# Patient Record
Sex: FEMALE | Race: WHITE
[De-identification: names, ages, dates, MRNs, and addresses within clinical notes are randomized per-mention and may not be internally consistent; named-entity substitution may affect disease eponyms.]

---

## 2022-02-22 ENCOUNTER — HOSPITAL ENCOUNTER (OUTPATIENT)
Dept: HOSPITAL 102 - ANHED | Age: 72
Setting detail: OBSERVATION
LOS: 1 days | Discharge: HOME | End: 2022-02-23
Payer: MEDICARE

## 2022-02-22 VITALS
RESPIRATION RATE: 23 BRPM | OXYGEN SATURATION: 100 % | DIASTOLIC BLOOD PRESSURE: 82 MMHG | SYSTOLIC BLOOD PRESSURE: 140 MMHG | HEART RATE: 71 BPM

## 2022-02-22 VITALS
SYSTOLIC BLOOD PRESSURE: 98 MMHG | OXYGEN SATURATION: 100 % | DIASTOLIC BLOOD PRESSURE: 74 MMHG | RESPIRATION RATE: 23 BRPM | HEART RATE: 75 BPM

## 2022-02-22 VITALS — DIASTOLIC BLOOD PRESSURE: 74 MMHG | HEART RATE: 75 BPM | SYSTOLIC BLOOD PRESSURE: 98 MMHG | RESPIRATION RATE: 23 BRPM

## 2022-02-22 VITALS
OXYGEN SATURATION: 100 % | DIASTOLIC BLOOD PRESSURE: 92 MMHG | RESPIRATION RATE: 22 BRPM | HEART RATE: 86 BPM | SYSTOLIC BLOOD PRESSURE: 176 MMHG

## 2022-02-22 VITALS
OXYGEN SATURATION: 98 % | RESPIRATION RATE: 18 BRPM | HEART RATE: 100 BPM | TEMPERATURE: 98.06 F | DIASTOLIC BLOOD PRESSURE: 82 MMHG | SYSTOLIC BLOOD PRESSURE: 125 MMHG

## 2022-02-22 VITALS
OXYGEN SATURATION: 100 % | SYSTOLIC BLOOD PRESSURE: 161 MMHG | DIASTOLIC BLOOD PRESSURE: 88 MMHG | RESPIRATION RATE: 17 BRPM | HEART RATE: 79 BPM

## 2022-02-22 VITALS
RESPIRATION RATE: 12 BRPM | DIASTOLIC BLOOD PRESSURE: 89 MMHG | SYSTOLIC BLOOD PRESSURE: 193 MMHG | HEART RATE: 76 BPM | OXYGEN SATURATION: 99 %

## 2022-02-22 VITALS
HEART RATE: 75 BPM | SYSTOLIC BLOOD PRESSURE: 148 MMHG | DIASTOLIC BLOOD PRESSURE: 86 MMHG | RESPIRATION RATE: 21 BRPM | OXYGEN SATURATION: 100 %

## 2022-02-22 VITALS
DIASTOLIC BLOOD PRESSURE: 80 MMHG | RESPIRATION RATE: 18 BRPM | SYSTOLIC BLOOD PRESSURE: 160 MMHG | OXYGEN SATURATION: 100 % | HEART RATE: 79 BPM

## 2022-02-22 VITALS
RESPIRATION RATE: 25 BRPM | SYSTOLIC BLOOD PRESSURE: 188 MMHG | OXYGEN SATURATION: 100 % | DIASTOLIC BLOOD PRESSURE: 74 MMHG | HEART RATE: 75 BPM

## 2022-02-22 VITALS — HEART RATE: 78 BPM

## 2022-02-22 VITALS
SYSTOLIC BLOOD PRESSURE: 115 MMHG | OXYGEN SATURATION: 96 % | DIASTOLIC BLOOD PRESSURE: 67 MMHG | TEMPERATURE: 97.52 F | HEART RATE: 81 BPM | RESPIRATION RATE: 20 BRPM

## 2022-02-22 VITALS
RESPIRATION RATE: 18 BRPM | SYSTOLIC BLOOD PRESSURE: 152 MMHG | OXYGEN SATURATION: 100 % | DIASTOLIC BLOOD PRESSURE: 102 MMHG | TEMPERATURE: 96.44 F | HEART RATE: 83 BPM

## 2022-02-22 VITALS
HEART RATE: 70 BPM | RESPIRATION RATE: 18 BRPM | OXYGEN SATURATION: 100 % | DIASTOLIC BLOOD PRESSURE: 87 MMHG | SYSTOLIC BLOOD PRESSURE: 145 MMHG

## 2022-02-22 VITALS — BODY MASS INDEX: 42.1 KG/M2 | BODY MASS INDEX: 1 KG/M2

## 2022-02-22 VITALS
HEART RATE: 83 BPM | RESPIRATION RATE: 13 BRPM | OXYGEN SATURATION: 99 % | SYSTOLIC BLOOD PRESSURE: 140 MMHG | DIASTOLIC BLOOD PRESSURE: 87 MMHG

## 2022-02-22 VITALS
DIASTOLIC BLOOD PRESSURE: 79 MMHG | HEART RATE: 80 BPM | RESPIRATION RATE: 21 BRPM | SYSTOLIC BLOOD PRESSURE: 163 MMHG | OXYGEN SATURATION: 100 %

## 2022-02-22 VITALS — HEART RATE: 75 BPM

## 2022-02-22 VITALS — HEART RATE: 87 BPM

## 2022-02-22 DIAGNOSIS — Z98.84: ICD-10-CM

## 2022-02-22 DIAGNOSIS — G47.33: ICD-10-CM

## 2022-02-22 DIAGNOSIS — I34.0: ICD-10-CM

## 2022-02-22 DIAGNOSIS — F41.9: ICD-10-CM

## 2022-02-22 DIAGNOSIS — I48.91: ICD-10-CM

## 2022-02-22 DIAGNOSIS — Z90.49: ICD-10-CM

## 2022-02-22 DIAGNOSIS — I48.20: ICD-10-CM

## 2022-02-22 DIAGNOSIS — E03.9: ICD-10-CM

## 2022-02-22 DIAGNOSIS — K21.9: ICD-10-CM

## 2022-02-22 DIAGNOSIS — I10: ICD-10-CM

## 2022-02-22 DIAGNOSIS — Z79.01: ICD-10-CM

## 2022-02-22 DIAGNOSIS — R07.9: Primary | ICD-10-CM

## 2022-02-22 LAB
ADD MANUAL DIFF: NO
ALANINE AMINOTRANSFERASE: 14 U/L (ref 4–35)
ALBUMIN LEVEL: 4.2 G/DL (ref 3.5–5.1)
ALBUMIN SPEC-MCNC: 4.2 G/DL (ref 3.5–5.1)
ALKALINE PHOSPHATASE: 81 U/L (ref 38–126)
ALP SERPL-CCNC: 81 U/L (ref 38–126)
ALT SERPL-CCNC: 14 U/L (ref 4–35)
ANION GAP: 6 MMOL/L (ref 8–16)
ASPARTATE AMINO TRANSFERASE: 26 U/L (ref 14–36)
AST SERPL-CCNC: 26 U/L (ref 14–36)
BASIC METABOLIC AND HEMATOCRIT PNL BLD: 39.3 % (ref 37–47)
BILIRUBIN,TOTAL: 0.4 MG/DL (ref 0.2–1.3)
BLOOD UREA NITROGEN: 18 MG/DL (ref 7–17)
BUN SERPL-MCNC: 18 MG/DL (ref 7–17)
CALCIUM: 9.5 MG/DL (ref 8.4–10.2)
CARBON DIOXIDE: 26 MMOL/L (ref 22–30)
CHLORIDE SPEC-MCNC: 107 MMOL/L (ref 98–107)
CHLORIDE: 107 MMOL/L (ref 98–107)
CREAT CL PREDICTED SERPL C-G-VRATE: 57 ML/MIN
DELTA APTT PPP: 28.5 SECONDS (ref 22.3–36.8)
ESTIMATED CRCL CALCULATION: 57 ML/MIN
ESTIMATED GLOMERULAR FILT RATE: > 60
GFRSERPLBLD MDRD-ARVRAT: > 60 ML/MIN/{1.73_M2}
GLUCOSE SERPL-MCNC: 91 MG/DL (ref 65–110)
GLUCOSE: 91 MG/DL (ref 65–110)
HEMATOCRIT: 39.3 % (ref 37–47)
HEMOGLOBIN: 12.5 G/DL (ref 12–15)
HGB+HCT PNL BLD: 39.3 % (ref 37–47)
INR: 1.1
IPF CHARGE: (no result)
LIPASE SERPL-CCNC: 32 U/L (ref 23–300)
LIPASE: 32 U/L (ref 23–300)
MCV RBC: 90.1 FL (ref 80–100)
MEAN CORPUSCULAR HEMOGLOBIN: 28.7 PG (ref 26–34)
MEAN CORPUSCULAR HGB CONC: 31.8 G/DL (ref 32–36)
PARTIAL THROMBOPLASTIN TIME: 28.5 SECONDS (ref 22.3–36.8)
PLATELET COUNT RESULT: 182 K/MM3 (ref 150–375)
POTASSIUM: 4.4 MMOL/L (ref 3.4–5)
PROT SERPL-MCNC: 7 G/DL (ref 6.3–8.2)
PROTHROMBIN TIME: 13.8 SECONDS (ref 11.1–14.7)
RED BLOOD COUNT: 4.36 M/MM3 (ref 4.2–5.4)
SODIUM: 139 MMOL/L (ref 137–145)
TOTAL PROTEIN: 7 G/DL (ref 6.3–8.2)
TROPONIN I: < 0.012 NG/ML (ref 0–0.03)
WHITE BLOOD COUNT: 3.7 K/MM3 (ref 4.5–10)

## 2022-02-22 PROCEDURE — 71046 X-RAY EXAM CHEST 2 VIEWS: CPT

## 2022-02-22 PROCEDURE — 99285 EMERGENCY DEPT VISIT HI MDM: CPT

## 2022-02-22 PROCEDURE — 84484 ASSAY OF TROPONIN QUANT: CPT

## 2022-02-22 PROCEDURE — 85025 COMPLETE CBC W/AUTO DIFF WBC: CPT

## 2022-02-22 PROCEDURE — A9270 NON-COVERED ITEM OR SERVICE: HCPCS

## 2022-02-22 PROCEDURE — 85610 PROTHROMBIN TIME: CPT

## 2022-02-22 PROCEDURE — 83690 ASSAY OF LIPASE: CPT

## 2022-02-22 PROCEDURE — 93017 CV STRESS TEST TRACING ONLY: CPT

## 2022-02-22 PROCEDURE — A9502 TC99M TETROFOSMIN: HCPCS

## 2022-02-22 PROCEDURE — 36415 COLL VENOUS BLD VENIPUNCTURE: CPT

## 2022-02-22 PROCEDURE — 80053 COMPREHEN METABOLIC PANEL: CPT

## 2022-02-22 PROCEDURE — 93005 ELECTROCARDIOGRAM TRACING: CPT

## 2022-02-22 PROCEDURE — 78452 HT MUSCLE IMAGE SPECT MULT: CPT

## 2022-02-22 PROCEDURE — G0378 HOSPITAL OBSERVATION PER HR: HCPCS

## 2022-02-22 PROCEDURE — 85730 THROMBOPLASTIN TIME PARTIAL: CPT

## 2022-02-22 RX ADMIN — ALPRAZOLAM 0.5 MG: 0.5 TABLET ORAL at 23:27

## 2022-02-22 RX ADMIN — METOPROLOL TARTRATE 25 MG: 25 TABLET, FILM COATED ORAL at 23:27

## 2022-02-22 RX ADMIN — ASPIRIN 81 MG CHEWABLE TABLET 324 MG: 81 TABLET CHEWABLE at 14:36

## 2022-02-22 RX ADMIN — APIXABAN 5 MG: 5 TABLET, FILM COATED ORAL at 23:27

## 2022-02-23 VITALS
OXYGEN SATURATION: 100 % | HEART RATE: 80 BPM | DIASTOLIC BLOOD PRESSURE: 50 MMHG | SYSTOLIC BLOOD PRESSURE: 106 MMHG | RESPIRATION RATE: 20 BRPM | TEMPERATURE: 97.16 F

## 2022-02-23 VITALS
OXYGEN SATURATION: 99 % | HEART RATE: 77 BPM | DIASTOLIC BLOOD PRESSURE: 96 MMHG | SYSTOLIC BLOOD PRESSURE: 146 MMHG | RESPIRATION RATE: 16 BRPM | TEMPERATURE: 98 F

## 2022-02-23 VITALS
RESPIRATION RATE: 18 BRPM | OXYGEN SATURATION: 100 % | HEART RATE: 67 BPM | TEMPERATURE: 97 F | SYSTOLIC BLOOD PRESSURE: 119 MMHG | DIASTOLIC BLOOD PRESSURE: 101 MMHG

## 2022-02-23 VITALS
RESPIRATION RATE: 16 BRPM | SYSTOLIC BLOOD PRESSURE: 101 MMHG | OXYGEN SATURATION: 97 % | TEMPERATURE: 98.24 F | HEART RATE: 94 BPM | DIASTOLIC BLOOD PRESSURE: 55 MMHG

## 2022-02-23 VITALS — HEART RATE: 70 BPM

## 2022-02-23 VITALS — HEART RATE: 106 BPM

## 2022-02-23 VITALS — HEART RATE: 76 BPM

## 2022-02-23 VITALS — HEART RATE: 80 BPM

## 2022-02-23 RX ADMIN — Medication 10 MG: at 15:32

## 2022-02-23 RX ADMIN — SERTRALINE HYDROCHLORIDE 200 MG: 50 TABLET, FILM COATED ORAL at 15:33

## 2022-02-23 RX ADMIN — MONTELUKAST SODIUM 10 MG: 10 TABLET, FILM COATED ORAL at 15:32

## 2022-02-23 RX ADMIN — APIXABAN 5 MG: 5 TABLET, FILM COATED ORAL at 15:33

## 2022-02-23 RX ADMIN — LEVOTHYROXINE SODIUM 88 MCG: 88 TABLET ORAL at 05:26

## 2022-02-23 RX ADMIN — METOPROLOL TARTRATE 25 MG: 25 TABLET, FILM COATED ORAL at 15:32

## 2022-02-23 RX ADMIN — Medication 1 CAP: at 15:33

## 2022-02-23 RX ADMIN — PANTOPRAZOLE SODIUM 40 MG: 40 TABLET, DELAYED RELEASE ORAL at 15:33

## 2024-01-05 ENCOUNTER — HOSPITAL ENCOUNTER (OUTPATIENT)
Dept: HOSPITAL 102 - ANHIMG | Age: 74
Discharge: HOME | End: 2024-01-05
Payer: MEDICARE

## 2024-01-05 DIAGNOSIS — M85.852: ICD-10-CM

## 2024-01-05 DIAGNOSIS — M81.0: ICD-10-CM

## 2024-01-05 DIAGNOSIS — M85.851: ICD-10-CM

## 2024-01-05 DIAGNOSIS — Z78.0: ICD-10-CM

## 2024-01-05 DIAGNOSIS — Z12.31: Primary | ICD-10-CM

## 2024-01-05 PROCEDURE — 77080 DXA BONE DENSITY AXIAL: CPT

## 2024-01-05 PROCEDURE — 77063 BREAST TOMOSYNTHESIS BI: CPT

## 2024-01-05 PROCEDURE — 77067 SCR MAMMO BI INCL CAD: CPT

## 2024-08-31 ENCOUNTER — HOSPITAL ENCOUNTER (EMERGENCY)
Age: 74
Discharge: HOME | End: 2024-08-31
Attending: STUDENT IN AN ORGANIZED HEALTH CARE EDUCATION/TRAINING PROGRAM
Payer: MEDICARE

## 2024-08-31 ENCOUNTER — APPOINTMENT (OUTPATIENT)
Dept: GENERAL RADIOLOGY | Age: 74
End: 2024-08-31
Payer: MEDICARE

## 2024-08-31 VITALS
BODY MASS INDEX: 39.27 KG/M2 | SYSTOLIC BLOOD PRESSURE: 134 MMHG | WEIGHT: 200 LBS | TEMPERATURE: 98.2 F | OXYGEN SATURATION: 97 % | RESPIRATION RATE: 17 BRPM | DIASTOLIC BLOOD PRESSURE: 73 MMHG | HEART RATE: 85 BPM | HEIGHT: 60 IN

## 2024-08-31 DIAGNOSIS — R11.0 NAUSEA: ICD-10-CM

## 2024-08-31 DIAGNOSIS — M54.6 ACUTE LEFT-SIDED THORACIC BACK PAIN: Primary | ICD-10-CM

## 2024-08-31 LAB
ALBUMIN SERPL-MCNC: 3.7 G/DL (ref 3.5–5.2)
ALP SERPL-CCNC: 81 U/L (ref 35–104)
ALT SERPL-CCNC: 9 U/L (ref 5–33)
ANION GAP SERPL CALCULATED.3IONS-SCNC: 10 MMOL/L (ref 7–19)
AST SERPL-CCNC: 16 U/L (ref 5–32)
BASOPHILS # BLD: 0 K/UL (ref 0–0.2)
BASOPHILS NFR BLD: 0.8 % (ref 0–1)
BILIRUB SERPL-MCNC: 0.5 MG/DL (ref 0.2–1.2)
BUN SERPL-MCNC: 13 MG/DL (ref 8–23)
CALCIUM SERPL-MCNC: 8.9 MG/DL (ref 8.8–10.2)
CHLORIDE SERPL-SCNC: 107 MMOL/L (ref 98–111)
CO2 SERPL-SCNC: 25 MMOL/L (ref 22–29)
CREAT SERPL-MCNC: 0.6 MG/DL (ref 0.5–0.9)
EOSINOPHIL # BLD: 0 K/UL (ref 0–0.6)
EOSINOPHIL NFR BLD: 1 % (ref 0–5)
ERYTHROCYTE [DISTWIDTH] IN BLOOD BY AUTOMATED COUNT: 13.1 % (ref 11.5–14.5)
GLUCOSE SERPL-MCNC: 96 MG/DL (ref 70–99)
HCT VFR BLD AUTO: 36.4 % (ref 37–47)
HGB BLD-MCNC: 11.2 G/DL (ref 12–16)
IMM GRANULOCYTES # BLD: 0 K/UL
LIPASE SERPL-CCNC: 13 U/L (ref 13–60)
LYMPHOCYTES # BLD: 0.7 K/UL (ref 1.1–4.5)
LYMPHOCYTES NFR BLD: 18.4 % (ref 20–40)
MCH RBC QN AUTO: 28.6 PG (ref 27–31)
MCHC RBC AUTO-ENTMCNC: 30.8 G/DL (ref 33–37)
MCV RBC AUTO: 93.1 FL (ref 81–99)
MONOCYTES # BLD: 0.3 K/UL (ref 0–0.9)
MONOCYTES NFR BLD: 7.4 % (ref 0–10)
NEUTROPHILS # BLD: 2.8 K/UL (ref 1.5–7.5)
NEUTS SEG NFR BLD: 72.1 % (ref 50–65)
PLATELET # BLD AUTO: 176 K/UL (ref 130–400)
PMV BLD AUTO: 10 FL (ref 9.4–12.3)
POTASSIUM SERPL-SCNC: 4 MMOL/L (ref 3.5–5)
PROT SERPL-MCNC: 5.9 G/DL (ref 6.4–8.3)
RBC # BLD AUTO: 3.91 M/UL (ref 4.2–5.4)
SODIUM SERPL-SCNC: 142 MMOL/L (ref 136–145)
TROPONIN, HIGH SENSITIVITY: 22 NG/L (ref 0–14)
TROPONIN, HIGH SENSITIVITY: 22 NG/L (ref 0–14)
WBC # BLD AUTO: 3.9 K/UL (ref 4.8–10.8)

## 2024-08-31 PROCEDURE — 6360000002 HC RX W HCPCS: Performed by: STUDENT IN AN ORGANIZED HEALTH CARE EDUCATION/TRAINING PROGRAM

## 2024-08-31 PROCEDURE — 71045 X-RAY EXAM CHEST 1 VIEW: CPT

## 2024-08-31 PROCEDURE — 96374 THER/PROPH/DIAG INJ IV PUSH: CPT

## 2024-08-31 PROCEDURE — 36415 COLL VENOUS BLD VENIPUNCTURE: CPT

## 2024-08-31 PROCEDURE — 80053 COMPREHEN METABOLIC PANEL: CPT

## 2024-08-31 PROCEDURE — 96375 TX/PRO/DX INJ NEW DRUG ADDON: CPT

## 2024-08-31 PROCEDURE — 99285 EMERGENCY DEPT VISIT HI MDM: CPT

## 2024-08-31 PROCEDURE — 84484 ASSAY OF TROPONIN QUANT: CPT

## 2024-08-31 PROCEDURE — 85025 COMPLETE CBC W/AUTO DIFF WBC: CPT

## 2024-08-31 PROCEDURE — 83690 ASSAY OF LIPASE: CPT

## 2024-08-31 RX ORDER — ONDANSETRON 2 MG/ML
4 INJECTION INTRAMUSCULAR; INTRAVENOUS ONCE
Status: COMPLETED | OUTPATIENT
Start: 2024-08-31 | End: 2024-08-31

## 2024-08-31 RX ORDER — ATORVASTATIN CALCIUM 10 MG/1
10 TABLET, FILM COATED ORAL DAILY
COMMUNITY

## 2024-08-31 RX ORDER — ETODOLAC 300 MG
300 CAPSULE ORAL DAILY
COMMUNITY

## 2024-08-31 RX ORDER — ONDANSETRON 4 MG/1
4 TABLET, ORALLY DISINTEGRATING ORAL 3 TIMES DAILY PRN
Qty: 21 TABLET | Refills: 0 | Status: SHIPPED | OUTPATIENT
Start: 2024-08-31

## 2024-08-31 RX ORDER — BUSPIRONE HYDROCHLORIDE 15 MG/1
15 TABLET ORAL 3 TIMES DAILY
COMMUNITY

## 2024-08-31 RX ORDER — KETOROLAC TROMETHAMINE 30 MG/ML
30 INJECTION, SOLUTION INTRAMUSCULAR; INTRAVENOUS ONCE
Status: COMPLETED | OUTPATIENT
Start: 2024-08-31 | End: 2024-08-31

## 2024-08-31 RX ADMIN — KETOROLAC TROMETHAMINE 30 MG: 30 INJECTION, SOLUTION INTRAMUSCULAR at 10:34

## 2024-08-31 RX ADMIN — ONDANSETRON 4 MG: 2 INJECTION, SOLUTION INTRAMUSCULAR; INTRAVENOUS at 10:35

## 2024-08-31 ASSESSMENT — PAIN SCALES - GENERAL: PAINLEVEL_OUTOF10: 8

## 2024-08-31 ASSESSMENT — ENCOUNTER SYMPTOMS
VOMITING: 0
ABDOMINAL PAIN: 0
EYE PAIN: 0
CHEST TIGHTNESS: 0
BACK PAIN: 1
SORE THROAT: 0
COUGH: 0
SHORTNESS OF BREATH: 0
DIARRHEA: 0
NAUSEA: 1
EYE REDNESS: 0

## 2024-08-31 ASSESSMENT — PAIN DESCRIPTION - LOCATION: LOCATION: CHEST

## 2024-08-31 ASSESSMENT — PAIN DESCRIPTION - DESCRIPTORS: DESCRIPTORS: PATIENT UNABLE TO DESCRIBE

## 2024-08-31 ASSESSMENT — PAIN - FUNCTIONAL ASSESSMENT: PAIN_FUNCTIONAL_ASSESSMENT: 0-10

## 2024-08-31 NOTE — ED PROVIDER NOTES
[facility] EMERGENCY DEPT  eMERGENCY dEPARTMENT eNCOUnter      Pt Name: Theresa Fleming  MRN: [id number]  Birthdate 1950  Date of evaluation: 2024  Provider: Sofie Jhaveri MD    CHIEF COMPLAINT       Chief Complaint   Patient presents with    Chest Pain     This morning, pt in town for a          HISTORY OF PRESENT ILLNESS   (Location/Symptom, Timing/Onset,Context/Setting, Quality, Duration, Modifying Factors, Severity)  Note limiting factors.     HPI    Theresa Fleming is a 73 y.o. female with PMH of permanent A-fib on Eliquis, hypertension, hyperlipidemia, gastric bypass surgery, cholecystectomy who presents to the emergency department with CC of left upper back pain and nausea.  Not complaining of any chest pain.  Patient is visiting from out of town for a .  She states that this morning she woke up with some achy left upper back pain as well as nausea without vomiting.  She states that she has had multiple injuries over the past few months to her left and right arm/shoulders as well as her left rib cage, and suspects that the pain is likely musculoskeletal but was worried that it might be cardiac.  She has no history of any obstructive coronary artery disease.  She reports that her father  from lung cancer.  She is a non-smoker, has KELLI but is noncompliant with CPAP.        NursingNotes were reviewed.    REVIEW OF SYSTEMS    (2-9 systems for level 4, 10 or more for level 5)     Review of Systems   Constitutional:  Negative for chills, fatigue and fever.   HENT:  Negative for congestion and sore throat.    Eyes:  Negative for pain and redness.   Respiratory:  Negative for cough, chest tightness and shortness of breath.    Cardiovascular:  Negative for chest pain and leg swelling.   Gastrointestinal:  Positive for nausea. Negative for abdominal pain, diarrhea and vomiting.   Genitourinary:  Negative for dysuria and urgency.   Musculoskeletal:  Positive for back pain. Negative for neck pain and neck 
Maulik

## 2024-09-10 LAB
EKG P AXIS: NORMAL DEGREES
EKG P AXIS: NORMAL DEGREES
EKG P-R INTERVAL: NORMAL MS
EKG P-R INTERVAL: NORMAL MS
EKG Q-T INTERVAL: 368 MS
EKG Q-T INTERVAL: 376 MS
EKG QRS DURATION: 90 MS
EKG QRS DURATION: 94 MS
EKG QTC CALCULATION (BAZETT): 415 MS
EKG QTC CALCULATION (BAZETT): 432 MS
EKG T AXIS: 21 DEGREES
EKG T AXIS: 25 DEGREES